# Patient Record
Sex: FEMALE | URBAN - METROPOLITAN AREA
[De-identification: names, ages, dates, MRNs, and addresses within clinical notes are randomized per-mention and may not be internally consistent; named-entity substitution may affect disease eponyms.]

---

## 2021-02-25 ENCOUNTER — APPOINTMENT (RX ONLY)
Dept: URBAN - METROPOLITAN AREA CLINIC 74 | Facility: CLINIC | Age: 46
Setting detail: DERMATOLOGY
End: 2021-02-25

## 2021-02-25 DIAGNOSIS — D22 MELANOCYTIC NEVI: ICD-10-CM

## 2021-02-25 DIAGNOSIS — L20.89 OTHER ATOPIC DERMATITIS: ICD-10-CM | Status: RESOLVING

## 2021-02-25 PROBLEM — D22.5 MELANOCYTIC NEVI OF TRUNK: Status: ACTIVE | Noted: 2021-02-25

## 2021-02-25 PROBLEM — D22.62 MELANOCYTIC NEVI OF LEFT UPPER LIMB, INCLUDING SHOULDER: Status: ACTIVE | Noted: 2021-02-25

## 2021-02-25 PROCEDURE — ? TREATMENT REGIMEN

## 2021-02-25 PROCEDURE — ? OBSERVATION AND MEASURE

## 2021-02-25 PROCEDURE — ? PRESCRIPTION

## 2021-02-25 PROCEDURE — ? COUNSELING

## 2021-02-25 PROCEDURE — 99203 OFFICE O/P NEW LOW 30 MIN: CPT

## 2021-02-25 RX ORDER — TRIAMCINOLONE ACETONIDE 1 MG/G
CREAM TOPICAL BID
Qty: 1 | Refills: 1 | Status: ERX | COMMUNITY
Start: 2021-02-25

## 2021-02-25 RX ADMIN — TRIAMCINOLONE ACETONIDE: 1 CREAM TOPICAL at 00:00

## 2021-02-25 ASSESSMENT — LOCATION SIMPLE DESCRIPTION DERM
LOCATION SIMPLE: LEFT SHOULDER
LOCATION SIMPLE: RIGHT UPPER ARM
LOCATION SIMPLE: LEFT UPPER BACK

## 2021-02-25 ASSESSMENT — LOCATION DETAILED DESCRIPTION DERM
LOCATION DETAILED: LEFT SUPERIOR UPPER BACK
LOCATION DETAILED: LEFT POSTERIOR SHOULDER
LOCATION DETAILED: RIGHT ANTECUBITAL SKIN

## 2021-02-25 ASSESSMENT — LOCATION ZONE DERM
LOCATION ZONE: ARM
LOCATION ZONE: TRUNK

## 2021-02-25 NOTE — HPI: RASH
How Severe Is Your Rash?: moderate
Is This A New Presentation, Or A Follow-Up?: Rash
Additional History: Pt presents for rash on right inner arm x 3 mo. Pt reports dr. Araujo (referring provider) is trying to r/o sarcoidosis due to enlarged lymph nodes - pulmonary notes - dr vigil is pulmonologist who is monitoring and considering pulm node bx prn. \\nfor rash on right ACF, Pt has tried HC and lotion but had no relief.  Rash however recently has been resolving.\\nlabs 11/2020 w/ normal cbc, cmp,spep, tryptase, ACE level, quantiferon gold, etc - see attachments.  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  Pt also has \"SAD\"- specific Ab deficiency.\\nPt feels well - no systemic sx currently except has baseline long-standing night sweats; no further cough (had in summer 2020 but resolved), etc..\\nPt has season allergies.

## 2021-02-25 NOTE — PROCEDURE: TREATMENT REGIMEN
Detail Level: Zone
Initiate Treatment: TAC 0.1% cr - Apply to affected areas on body BID 7-10 days then prn flares

## 2021-02-25 NOTE — PROCEDURE: COUNSELING
Detail Level: Detailed
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 Pt has always had night sweats and recent cough - latter resolved\\n- recent labs WNL; crp slightly high; but ACE, quant gold, spep, tryptase, cbc, cmp etc WNL 11/2020\\n- start TAC 0.1% cr to body bid x7-10 days prn\\n- dry and sens skin care disc; spf 30+\\n- RTC prn / 2 mo